# Patient Record
Sex: MALE | Race: WHITE
[De-identification: names, ages, dates, MRNs, and addresses within clinical notes are randomized per-mention and may not be internally consistent; named-entity substitution may affect disease eponyms.]

---

## 2020-04-20 ENCOUNTER — HOSPITAL ENCOUNTER (EMERGENCY)
Dept: HOSPITAL 25 - UCCORT | Age: 30
Discharge: HOME | End: 2020-04-20
Payer: COMMERCIAL

## 2020-04-20 VITALS — SYSTOLIC BLOOD PRESSURE: 127 MMHG | DIASTOLIC BLOOD PRESSURE: 86 MMHG

## 2020-04-20 DIAGNOSIS — J40: Primary | ICD-10-CM

## 2020-04-20 DIAGNOSIS — Z20.828: ICD-10-CM

## 2020-04-20 PROCEDURE — G0463 HOSPITAL OUTPT CLINIC VISIT: HCPCS

## 2020-04-20 PROCEDURE — 87635 SARS-COV-2 COVID-19 AMP PRB: CPT

## 2020-04-20 PROCEDURE — U0003 INFECTIOUS AGENT DETECTION BY NUCLEIC ACID (DNA OR RNA); SEVERE ACUTE RESPIRATORY SYNDROME CORONAVIRUS 2 (SARS-COV-2) (CORONAVIRUS DISEASE [COVID-19]), AMPLIFIED PROBE TECHNIQUE, MAKING USE OF HIGH THROUGHPUT TECHNOLOGIES AS DESCRIBED BY CMS-2020-01-R: HCPCS

## 2020-04-20 PROCEDURE — 99202 OFFICE O/P NEW SF 15 MIN: CPT

## 2020-04-20 NOTE — UC
Respiratory Complaint HPI





- HPI Summary


HPI Summary: 


29-year-old male presents with 5 week history of a non-productive cough. States 

cough was mild at first and then worsened after about a week and has been 

persistent since that time. Reports a low grade fever of 99.3 F at onset of 

symptoms but none since that time. Cough associated with some mild post-nasal 

drip. Reports some fatigue over the past few days. He denies exposure to any 

one isolated for or testing positive for COVID-19. States he has been 

maintaining social distancing and isolating himself at home with his 

significant other who is presently without symptoms. Denies chest pain or 

shortness of breath.








- History of Current Complaint


Chief Complaint: UCGeneralIllness


Stated Complaint: COUGH


Pain Intensity: 0





- Allergies/Home Medications


Allergies/Adverse Reactions: 


 Allergies











Allergy/AdvReac Type Severity Reaction Status Date / Time


 


No Known Allergies Allergy   Verified 04/20/20 14:19











Home Medications: 


 Home Medications





Azithromyxin LEROY (NF) [Z-Leroy (Zithromax) 250 mg tabs #6] 2 tab PO .TODAY, THEN 

1 DAILY #6 tab 04/20/20 [Rx]


Benzonatate CAP* [Tessalon 100 MG CAP*] 100 mg PO TID PRN #21 cap 04/20/20 [Rx]











PMH/Surg Hx/FS Hx/Imm Hx


Previously Healthy: Yes - Denies significant PMH





- Surgical History


Surgical History: None





- Family History


Family History: Denies significant FMH





- Social History


Occupation: Employed Full-time


Lives: With Family


Alcohol Use: None


Substance Use Type: None


Smoking Status (MU): Never Smoked Tobacco





Review of Systems


All Other Systems Reviewed And Are Negative: Yes


Constitutional: Positive: Fever - Low grade, Fatigue.  Negative: Chills


Eyes: Negative: Drainage, Eye Redness


ENT: Positive: Other - PND.  Negative: Sore Throat, Ear Ache, Nasal Discharge, 

Sinus Congestion, Sinus Pain/Tenderness


Respiratory: Positive: Cough.  Negative: Shortness Of Breath


Cardiovascular: Negative: Chest Pain


Gastrointestinal: Positive: Negative


Genitourinary: Positive: Negative


Musculoskeletal: Positive: Negative


Neurological/Mental Status: Positive: Negative


Is Patient Immunocompromised?: No





Physical Exam





- Summary


Physical Exam Summary: 


Physical exam was limited due to use of telemedicine however patient displayed 

no respiratory distress while video conferencing with provider.





Triage Information Reviewed: Yes


Vital Signs Reviewed: Yes





Respiratory Course/Dx





- Course


Course Of Treatment: 


29-year-old male presents with 5 week history of a non-productive cough. States 

cough was mild at first and then worsened after about a week and has been 

persistent since that time. Reports a low grade fever of 99.3 F at onset of 

symptoms but none since that time. Cough associated with some mild post-nasal 

drip. Reports some fatigue over the past few days. He denies exposure to any 

one isolated for or testing positive for COVID-19. States he has been 

maintaining social distancing and isolating himself at home with his 

significant other who is presently without symptoms. Denies chest pain or 

shortness of breath. Patient was agreeable to conducting the visit by 

telemedicine via [place]. Physical exam was limited however he did not 

appear to be in any acute respiratory distress. 


We discussed that based on his history I have a low suspicion for a COVID-19 

infection but would recommend testing at this time to which the patient was 

agreeable. Patient was noted to be afebrile with stable vital signs. 

Considering the duration of his symptoms I am recommending treatment for an 

acute bronchitis with a course of azithromycin as well as symptomatic treatment 

including Tessalon Perles 1 cap every 8 hours as needed for cough. He is to 

return here or follow up with primary care in 7 days if symptoms are not 

improving. Anticipatory guidance and warning symptoms were reviewed with the 

patient. Verbalizes understanding and agrees with plan of care.








- Differential Dx/Diagnosis


Differential Diagnosis/HQI/PQRI: Bronchitis, Lower Resp Infection, SARS - COVID 

19


Provider Diagnosis: 


 Bronchitis








Discharge ED





- Sign-Out/Discharge


Documenting (check all that apply): Patient Departure


All imaging exams completed and their final reports reviewed: No Studies





- Discharge Plan


Condition: Stable


Disposition: HOME


Prescriptions: 


Azithromyxin LEROY (NF) [Z-Leroy (Zithromax) 250 mg tabs #6] 2 tab PO .TODAY, THEN 

1 DAILY #6 tab


Benzonatate CAP* [Tessalon 100 MG CAP*] 100 mg PO TID PRN #21 cap


 PRN Reason: Cough


Patient Education Materials:  Acute Bronchitis (ED)


Forms:  COVID-19 Tested & Isolation


Referrals: 


No Primary Care Phys,NOPCP [Primary Care Provider] - 


[facility] [place] [facility] REFERRAL [Outside]


Additional Instructions: 


Your history and exam are consistent with acute bronchitis. Although I have a 

low suspicion for a COVID-19 infection I would recommend that we do testing at 

this time to rule this out. You will need to self-isolate for 14 days or until 

you have been instructed that you may discontinue.





Considering the duration of your symptoms we will start you on an antibiotic at 

this time.





Start azithromycin 2 tabs today then 1 tab a day for the next 4 days.





Get plenty of rest.





Drink plenty of fluids.





Run a cool mist humidifer in your room at night.





Take over the counter acetaminophen (Tylenol) or ibuprofen (Advil, Motrin) 

according to directions as needed for pain or fever.





Take Tessalon Perles 1 cap every 8 hours as needed for cough.





Return here or with primary care in 7 days if no improvement in symptoms. I 

have provided you with the contact information for the [facility] 

physician referral service if you need assistance with establishing with a 

primary care provider.





Seek immediate medical attention in the emergency room if you have fever 

greater than 100.5 F despite taking acetaminophen or ibuprofen, have chest pain

, difficulty breathing, or have any worsening of symptoms.





- Billing Disposition and Condition


Condition: STABLE


Disposition: Home

## 2020-04-23 NOTE — UC
- Progress Note


Progress Note: 





COVID test is negative.  You do not need to continue to quarantine 


Continue to physical distance. 





If symptoms persist or worsen, recommend follow up with PCP or return to urgent 

care.  





Course/Dx





- Diagnoses


Provider Diagnoses: 


 Bronchitis








Discharge ED





- Sign-Out/Discharge


Documenting (check all that apply): Post-Discharge Follow Up


All imaging exams completed and their final reports reviewed: No Studies





- Discharge Plan


Condition: Stable


Disposition: HOME


Prescriptions: 


Azithromyxin LEROY (NF) [Z-Leroy (Zithromax) 250 mg tabs #6] 2 tab PO .TODAY, THEN 

1 DAILY #6 tab


Benzonatate CAP* [Tessalon 100 MG CAP*] 100 mg PO TID PRN #21 cap


 PRN Reason: Cough


Patient Education Materials:  Acute Bronchitis (ED)


Forms:  COVID-19 Tested & Isolation


Referrals: 


[facility] [place]


No Primary Care Phys,NOPCP [Primary Care Provider] - 


Additional Instructions: 








Return here or with primary care in 7 days if no improvement in symptoms. I 

have provided you with the contact information for the [facility] 

physician referral service if you need assistance with establishing with a 

primary care provider.





Seek immediate medical attention in the emergency room if you have fever 

greater than 100.5 F despite taking acetaminophen or ibuprofen, have chest pain

, difficulty breathing, or have any worsening of symptoms.





- Billing Disposition and Condition


Condition: STABLE


Disposition: Home